# Patient Record
Sex: FEMALE | Race: WHITE | NOT HISPANIC OR LATINO | Employment: STUDENT | URBAN - METROPOLITAN AREA
[De-identification: names, ages, dates, MRNs, and addresses within clinical notes are randomized per-mention and may not be internally consistent; named-entity substitution may affect disease eponyms.]

---
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
## 2019-01-17 PROBLEM — M25.561 ARTHRALGIA OF BOTH KNEES: Status: ACTIVE | Noted: 2019-01-17

## 2019-01-17 PROBLEM — J30.2 SEASONAL ALLERGIES: Status: ACTIVE | Noted: 2019-01-17

## 2019-01-17 PROBLEM — M25.562 ARTHRALGIA OF BOTH KNEES: Status: ACTIVE | Noted: 2019-01-17

## 2019-01-17 PROBLEM — G89.29 CHRONIC RIGHT SHOULDER PAIN: Status: ACTIVE | Noted: 2019-01-17

## 2019-01-17 PROBLEM — M25.571 ARTHRALGIA OF BOTH ANKLES: Status: ACTIVE | Noted: 2019-01-17

## 2019-01-17 PROBLEM — M54.2 CERVICALGIA: Status: ACTIVE | Noted: 2019-01-17

## 2019-01-17 PROBLEM — M25.572 ARTHRALGIA OF BOTH ANKLES: Status: ACTIVE | Noted: 2019-01-17

## 2019-01-17 PROBLEM — G89.29 CHRONIC BILATERAL THORACIC BACK PAIN: Status: ACTIVE | Noted: 2019-01-17

## 2019-01-17 PROBLEM — M54.6 CHRONIC BILATERAL THORACIC BACK PAIN: Status: ACTIVE | Noted: 2019-01-17

## 2019-01-17 PROBLEM — N94.6 MENSTRUAL CRAMPS: Status: ACTIVE | Noted: 2019-01-17

## 2019-01-17 PROBLEM — M25.50 ARTHRALGIA OF MULTIPLE SITES: Status: ACTIVE | Noted: 2019-01-17

## 2019-01-17 PROBLEM — M25.511 CHRONIC RIGHT SHOULDER PAIN: Status: ACTIVE | Noted: 2019-01-17

## 2019-02-19 PROBLEM — R07.9 ACUTE CHEST PAIN: Status: ACTIVE | Noted: 2019-02-19

## 2019-02-19 PROBLEM — M25.579 ANKLE JOINT PAIN: Status: ACTIVE | Noted: 2019-02-19

## 2019-02-19 PROBLEM — M25.569 KNEE PAIN: Status: ACTIVE | Noted: 2019-02-19

## 2022-07-14 ENCOUNTER — TELEMEDICINE (OUTPATIENT)
Dept: INTERNAL MEDICINE | Facility: CLINIC | Age: 22
End: 2022-07-14

## 2022-07-14 DIAGNOSIS — U07.1 COVID-19 VIRUS INFECTION: Primary | ICD-10-CM

## 2022-07-14 DIAGNOSIS — R05.9 COUGH: ICD-10-CM

## 2022-07-14 PROCEDURE — 99204 OFFICE O/P NEW MOD 45 MIN: CPT | Performed by: PHYSICIAN ASSISTANT

## 2022-07-14 RX ORDER — BENZONATATE 100 MG/1
100 CAPSULE ORAL NIGHTLY PRN
Qty: 30 CAPSULE | Refills: 0 | Status: SHIPPED | OUTPATIENT
Start: 2022-07-14

## 2022-07-14 NOTE — PROGRESS NOTES
[facility] Internal Medicine    Tavia Green  2000   [id number]      Patient Care Team:  Nancie Hernandez MD as PCP - General (Internal Medicine)    Chief Complaint::   Chief Complaint   Patient presents with   • Fever   • Generalized Body Aches      You have chosen to receive care through a video visit. Do you consent to use a video visit for your medical care today? Yes  HPI  Tavia is a 22-year-old female who presents today to establish care.  Past medical history significant for COVID infection approximately 1 year ago today.  Her mother, also physicians assistant, is on FaceTime during this appointment.  Per mother, patient has had a difficult recovery over the past year.  Significant compromise with her lungs following COVID infection.  Per patient's mother, she was also diagnosed with mono and shingles during the time of her COVID infection.  Patient's mother reports she has never fully recovered.  Feels that lungs are still compromised.  Today, patient reports with new positive COVID test.  She had just returned from trip [place] from [place].  Symptoms include body aches, fever, fatigue.  She does have some congestion with occasional cough.  Concerns that she will continue to decline since she has never fully recovered from previous COVID infection.  She has taken Tylenol Cold and Flu, vitamin D, and biotin.        Patient Active Problem List   Diagnosis   • Arthralgia of both knees   • Arthralgia of multiple sites   • Chronic right shoulder pain   • Chronic bilateral thoracic back pain   • Menstrual cramps   • Body mass index (BMI) less than 19 in adult   • Seasonal allergies   • Arthralgia of both ankles   • Cervicalgia   • Acute chest pain   • Ankle joint pain   • Knee pain        Past Medical History:   Diagnosis Date   • Ankle injury     WALKING BOOT        No past surgical history on file.    Family History   Problem Relation Age of Onset   • Hypothyroidism Mother         ACQUIRED    • Gout  Mother         PRIMARY GOUT GREAT TOE   • Migraines Mother    • ADD / ADHD Mother         ADD   • Coronary artery disease Maternal Grandfather 25        PREMATURE   • Valvular heart disease Maternal Grandfather    • Hypertension Maternal Grandfather    • Hyperlipidemia Maternal Grandfather    • Atrial fibrillation Maternal Grandfather    • Coronary artery disease Maternal Great-Grandfather         PREMATURE   • Hyperlipidemia Maternal Great-Grandfather    • Breast cancer Maternal Great-Grandmother        Social History     Socioeconomic History   • Marital status: Single   Tobacco Use   • Smoking status: Never Smoker       No Known Allergies    Review of Systems   Constitutional: Positive for chills, fatigue and fever.   HENT: Positive for congestion.    Respiratory: Positive for cough and shortness of breath.    Endocrine: Negative for cold intolerance and heat intolerance.   Musculoskeletal: Positive for myalgias.        Vital Signs  There were no vitals filed for this visit.  There is no height or weight on file to calculate BMI.        Current Outpatient Medications:   •  benzonatate (Tessalon Perles) 100 MG capsule, Take 1 capsule by mouth At Night As Needed for Cough., Disp: 30 capsule, Rfl: 0  •  Nirmatrelvir & Ritonavir (PAXLOVID) 20 x 150 MG & 10 x 100MG tablet therapy pack tablet, Take 3 tablets by mouth 2 (Two) Times a Day for 5 days., Disp: 30 each, Rfl: 0    Physical Exam  Constitutional:       Appearance: Normal appearance. She is ill-appearing.   HENT:      Head: Normocephalic and atraumatic.      Nose: Nose normal.   Pulmonary:      Effort: No respiratory distress.   Skin:     Coloration: Skin is not jaundiced or pale.   Neurological:      General: No focal deficit present.      Mental Status: She is oriented to person, place, and time.   Psychiatric:         Mood and Affect: Mood normal.         Behavior: Behavior normal.         Thought Content: Thought content normal.          ACE III MINI             Results Review:    No results found for this or any previous visit (from the past 672 hour(s)).  Procedures    Medication Review: Medications reviewed and noted    Social History     Socioeconomic History   • Marital status: Single   Tobacco Use   • Smoking status: Never Smoker        Assessment/Plan:    Problem List Items Addressed This Visit    None     Visit Diagnoses     COVID-19 virus infection    -  Primary    Relevant Medications    Nirmatrelvir & Ritonavir (PAXLOVID) 20 x 150 MG & 10 x 100MG tablet therapy pack tablet    benzonatate (Tessalon Perles) 100 MG capsule    Cough        Relevant Medications    benzonatate (Tessalon Perles) 100 MG capsule         This visit has been rescheduled as a video visit to comply with patient safety concerns in accordance with CDC recommendations. Total time of discussion was 38 minutes.    There are no Patient Instructions on file for this visit.     Plan of care reviewed with patient at the conclusion of today's visit. Education was provided regarding diagnosis, management, and any prescribed or recommended OTC medications.Patient verbalizes understanding of and agreement with management plan.         I spent 28 minutes caring for Tavia on this date of service. This time includes time spent by me in the following activities:preparing for the visit, reviewing tests, obtaining and/or reviewing a separately obtained history, performing a medically appropriate examination and/or evaluation , counseling and educating the patient/family/caregiver and referring and communicating with other health care professionals     Tete Sagastume PA-C      Note: Part of this note may be an electronic transcription/translation of spoken language to printed text using the Dragon Dictation system.

## 2022-07-16 PROBLEM — R05.9 COUGH: Status: ACTIVE | Noted: 2022-07-16

## 2022-07-16 PROBLEM — U07.1 COVID-19 VIRUS INFECTION: Status: ACTIVE | Noted: 2022-07-16
